# Patient Record
Sex: FEMALE | URBAN - METROPOLITAN AREA
[De-identification: names, ages, dates, MRNs, and addresses within clinical notes are randomized per-mention and may not be internally consistent; named-entity substitution may affect disease eponyms.]

---

## 2017-05-26 ENCOUNTER — TRANSFERRED RECORDS (OUTPATIENT)
Dept: HEALTH INFORMATION MANAGEMENT | Facility: CLINIC | Age: 42
End: 2017-05-26

## 2017-05-31 ENCOUNTER — MEDICAL CORRESPONDENCE (OUTPATIENT)
Dept: HEALTH INFORMATION MANAGEMENT | Facility: CLINIC | Age: 42
End: 2017-05-31

## 2017-06-01 ENCOUNTER — CARE COORDINATION (OUTPATIENT)
Dept: GASTROENTEROLOGY | Facility: CLINIC | Age: 42
End: 2017-06-01

## 2017-06-01 NOTE — PROGRESS NOTES
[facility] Intake:     Referring/Requesting provider and Health care System: [facility] in [facility] contact - Name, Phone and Fax number: [phone]  FAX:  [phone]     Requested provider (if specified): Babin    Has patient been evaluated in clinic or had a procedure [facility] provider in the last 5 years: No       Indication/Diagnosis for consultation: Chronic pancreatitis: EUS for necrosectomy for pancreatic pseudocyst    Is diagnosis on list of approved diagnosis: Yes  -    Has patient been evaluated by another Gastroenterologist? Yes     Has patient had any imaging of chest/abdominal completed? No     Are images able/being pushed to our system? No, clinic stated they do not have images and are unaware of any outside images     Is patient aware of request for clinc consultation and ok to be contacted to schedule? Yes      Referral Received: 5/31/2017    Records received:  6/1/2017: No imaging or hard copy records received.   6//16/2017: hard copy records received by RN     MD review date:      6/18/2017: routed to Dr. Kumar for review.                          Clinical History (per RN review of records provided):   * No images in PACS:     5/25/2017: EUS for pancreatic cyst on CT scan (per notes- no CT final read)   - Endosonographic imaging of the pancreas showed sonographic changes indicitive of moderate chronic pancreatitis in the entire pancreas. The parachyma and diffuse echogenicity, hyperechoic strands, hyperechoic foci, lobularity and cysts. The Pancreatic duct had hyperechoic walls. The pancreatic duct measured up to 3mm in diameter.   - An anechoic, hypoechoic, multicystic and septated lesion suggestive of a cyst was identified in the pancreatic body. It does not communicate with the pancreatic duct. The lesions measured 69mm by 33mm in maximal cross sectional diameter. There were two compartments thickly . The outer walls of the  lesion was thick. There was no associated mass. There was internal debris and necrotic tissue within the fluid filled cavity.     Recommendations/Orders:    Chart reviewed electronically (Hard copy chart in clinic.)  Per Dr. Kumar   1. Need images for review.  2. Clinic     Called and spoke to Jose, she will call and get most recent images sent to us. Address and fax number given. She will call and let us know if images can be pushed to us.   Advised once we have images and they are reviewed we will most likely get her scheduled for clinic visit. She was amenable and verbalized understanding.     7/5/2017: received in Fax CT final read dated 6/13/2017. Need images sent to us. Left message for Jose to call back as we will need the actual images sent to us on disc or pushed to us.    9/26/2017: Message received from call center - Patient called requesting an appointment for pancreatitis. There was a dot phrase started and records gathered but it looks like images were never received.   I didn't start another dot phrase as most of the information was the same. Pt did state she has additional records since June that will be faxed to us in Referrals and once received, emailed to you. I did mention to the patient that we need the images which are at [facility] and those can be pushed.   Pt said she would call the records department of [facility] and request they get pushed to PACS.     9/28/2017: called film to grab images.   10/10/2017: no images in PACS received. No hard copy records received at this time.     Sunita GRIFFITH RN Coordinator  Dr. Babin, Dr. Harris & Dr. Kumar   Pancreas~Biliary  [phone] #[phone]

## 2017-06-13 ENCOUNTER — TRANSFERRED RECORDS (OUTPATIENT)
Dept: HEALTH INFORMATION MANAGEMENT | Facility: CLINIC | Age: 42
End: 2017-06-13

## 2017-10-18 NOTE — PROGRESS NOTES
Desiree was not available but I spoke to a very helpful nurse from Dr. Justice's office who will send the images on a disc.  Location of address confirmed.  This referral will be closed on 11/18/2017 if no images are received.     SR 10/18/2017  [id number]

## 2019-08-09 ENCOUNTER — TRANSFERRED RECORDS (OUTPATIENT)
Dept: HEALTH INFORMATION MANAGEMENT | Facility: CLINIC | Age: 44
End: 2019-08-09

## 2019-08-14 ENCOUNTER — TRANSFERRED RECORDS (OUTPATIENT)
Dept: HEALTH INFORMATION MANAGEMENT | Facility: CLINIC | Age: 44
End: 2019-08-14

## 2019-08-20 ENCOUNTER — MEDICAL CORRESPONDENCE (OUTPATIENT)
Dept: HEALTH INFORMATION MANAGEMENT | Facility: CLINIC | Age: 44
End: 2019-08-20

## 2019-09-18 ENCOUNTER — TELEPHONE (OUTPATIENT)
Dept: OTOLARYNGOLOGY | Facility: CLINIC | Age: 44
End: 2019-09-18

## 2019-09-18 NOTE — TELEPHONE ENCOUNTER
FUTURE VISIT INFORMATION      FUTURE VISIT INFORMATION:    Date: 9/23/2019    Time: 9:30 AM    Location: [facility]   REFERRAL INFORMATION:    Referring provider:  Dr. Jadiel Mendez    Referring providers clinic:  [facility]     Reason for visit/diagnosis  Nasal Congestion, Post-Nasal Drip     RECORDS REQUESTED FROM:       Clinic name Comments Records Status Imaging Status   [facility] 8/20/19 Referral by Dr. Jadiel Mendez  8/14/19, 7/9/19 Office visit with Dr. Gaytan  Received- in Epic/ Care Everywhere      [facility]  CT Head: 9/15/16, 4/22/16 Care Everywhere Received  Archived to PACS                             Action 9/18/19, 12:15 pm   Action Taken Sent fax request to [facility] to push images (CT Head) into  PACS - ao   9/19/2019 -7:45 AM-Received images from [facility] Archived to PACS PB

## 2019-09-23 ENCOUNTER — OFFICE VISIT (OUTPATIENT)
Dept: OTOLARYNGOLOGY | Facility: CLINIC | Age: 44
End: 2019-09-23
Payer: COMMERCIAL

## 2019-09-23 ENCOUNTER — PRE VISIT (OUTPATIENT)
Dept: OTOLARYNGOLOGY | Facility: CLINIC | Age: 44
End: 2019-09-23

## 2019-09-23 VITALS
WEIGHT: 148 LBS | DIASTOLIC BLOOD PRESSURE: 80 MMHG | BODY MASS INDEX: 23.78 KG/M2 | RESPIRATION RATE: 18 BRPM | HEIGHT: 66 IN | SYSTOLIC BLOOD PRESSURE: 129 MMHG | HEART RATE: 75 BPM

## 2019-09-23 DIAGNOSIS — R09.A2 GLOBUS PHARYNGEUS: Primary | ICD-10-CM

## 2019-09-23 RX ORDER — GABAPENTIN 300 MG/1
CAPSULE ORAL
Refills: 11 | COMMUNITY
Start: 2019-03-10

## 2019-09-23 RX ORDER — PROMETHAZINE HYDROCHLORIDE 25 MG/1
SUPPOSITORY RECTAL
Refills: 2 | COMMUNITY
Start: 2019-02-23

## 2019-09-23 RX ORDER — UBIDECARENONE 100 MG
1 CAPSULE ORAL
COMMUNITY
Start: 2018-02-26

## 2019-09-23 RX ORDER — FLUTICASONE PROPIONATE 50 MCG
1 SPRAY, SUSPENSION (ML) NASAL
COMMUNITY

## 2019-09-23 RX ORDER — PAROXETINE 20 MG/1
60 TABLET, FILM COATED ORAL
COMMUNITY

## 2019-09-23 RX ORDER — IPRATROPIUM BROMIDE 42 UG/1
SPRAY, METERED NASAL
COMMUNITY
Start: 2019-07-16

## 2019-09-23 RX ORDER — AZELASTINE 1 MG/ML
SPRAY, METERED NASAL
Refills: 11 | COMMUNITY
Start: 2019-02-23

## 2019-09-23 RX ORDER — OLANZAPINE 5 MG/1
TABLET, ORALLY DISINTEGRATING ORAL
Refills: 0 | COMMUNITY
Start: 2019-06-06

## 2019-09-23 RX ORDER — ASPIRIN 325 MG
TABLET ORAL
Refills: 11 | COMMUNITY
Start: 2019-05-14

## 2019-09-23 RX ORDER — DULOXETIN HYDROCHLORIDE 30 MG/1
CAPSULE, DELAYED RELEASE ORAL
Refills: 11 | COMMUNITY
Start: 2019-05-10

## 2019-09-23 RX ORDER — MELATONIN/LEMON BALM LEAF EXTR 5MG-500MCG
TABLET ORAL
Refills: 0 | COMMUNITY
Start: 2018-12-11

## 2019-09-23 RX ORDER — HYDROMORPHONE HYDROCHLORIDE 2 MG/1
2 TABLET ORAL
COMMUNITY
Start: 2019-09-18

## 2019-09-23 RX ORDER — OXYBUTYNIN CHLORIDE 5 MG/1
TABLET, EXTENDED RELEASE ORAL
Refills: 1 | COMMUNITY
Start: 2019-09-12

## 2019-09-23 RX ORDER — BACLOFEN 10 MG/1
TABLET ORAL
Refills: 5 | COMMUNITY
Start: 2019-09-12

## 2019-09-23 RX ORDER — SERTRALINE HYDROCHLORIDE 25 MG/1
TABLET, FILM COATED ORAL
Refills: 11 | COMMUNITY
Start: 2019-09-12

## 2019-09-23 RX ORDER — FEXOFENADINE HCL 180 MG/1
TABLET ORAL
Refills: 5 | COMMUNITY
Start: 2019-09-12

## 2019-09-23 RX ORDER — ERGOCALCIFEROL 1.25 MG/1
CAPSULE, LIQUID FILLED ORAL
Refills: 11 | COMMUNITY
Start: 2019-09-12

## 2019-09-23 RX ORDER — THIAMINE MONONITRATE (VIT B1) 100 MG
TABLET ORAL
Refills: 2 | COMMUNITY
Start: 2019-09-12

## 2019-09-23 RX ORDER — METOCLOPRAMIDE 10 MG/1
1 TABLET ORAL
COMMUNITY
Start: 2018-02-26

## 2019-09-23 RX ORDER — MECLIZINE HCL 12.5 MG 12.5 MG/1
TABLET ORAL
Refills: 5 | COMMUNITY
Start: 2019-09-13

## 2019-09-23 RX ORDER — POLYETHYLENE GLYCOL 3350 17 G/17G
17 POWDER, FOR SOLUTION ORAL
COMMUNITY

## 2019-09-23 RX ORDER — EPINEPHRINE 0.3 MG/.3ML
INJECTION SUBCUTANEOUS
Refills: 1 | COMMUNITY
Start: 2019-08-14

## 2019-09-23 RX ORDER — PROCHLORPERAZINE MALEATE 10 MG
TABLET ORAL
COMMUNITY
Start: 2018-02-26

## 2019-09-23 RX ORDER — PROCHLORPERAZINE 25 MG
SUPPOSITORY, RECTAL RECTAL
COMMUNITY
Start: 2018-02-23

## 2019-09-23 RX ORDER — HYDROXYZINE HYDROCHLORIDE 25 MG/1
TABLET, FILM COATED ORAL
Refills: 10 | COMMUNITY
Start: 2019-06-09

## 2019-09-23 RX ORDER — LORAZEPAM 1 MG/1
TABLET ORAL
Refills: 0 | COMMUNITY
Start: 2019-08-14

## 2019-09-23 RX ORDER — ONDANSETRON 4 MG/1
TABLET, ORALLY DISINTEGRATING ORAL
Refills: 4 | COMMUNITY
Start: 2019-09-12

## 2019-09-23 RX ORDER — PANCRELIPASE 60000; 12000; 38000 [USP'U]/1; [USP'U]/1; [USP'U]/1
CAPSULE, DELAYED RELEASE PELLETS ORAL
Refills: 5 | COMMUNITY
Start: 2019-05-01

## 2019-09-23 RX ORDER — CETIRIZINE HYDROCHLORIDE 10 MG/1
TABLET ORAL
Refills: 8 | COMMUNITY
Start: 2019-09-12

## 2019-09-23 RX ORDER — ONDANSETRON 4 MG/1
TABLET, ORALLY DISINTEGRATING ORAL
COMMUNITY
Start: 2018-11-08

## 2019-09-23 ASSESSMENT — PATIENT HEALTH QUESTIONNAIRE - PHQ9
10. IF YOU CHECKED OFF ANY PROBLEMS, HOW DIFFICULT HAVE THESE PROBLEMS MADE IT FOR YOU TO DO YOUR WORK, TAKE CARE OF THINGS AT HOME, OR GET ALONG WITH OTHER PEOPLE: SOMEWHAT DIFFICULT
SUM OF ALL RESPONSES TO PHQ QUESTIONS 1-9: 12
SUM OF ALL RESPONSES TO PHQ QUESTIONS 1-9: 12

## 2019-09-23 ASSESSMENT — PAIN SCALES - GENERAL: PAINLEVEL: MILD PAIN (3)

## 2019-09-23 ASSESSMENT — MIFFLIN-ST. JEOR: SCORE: 1344.07

## 2019-09-23 NOTE — LETTER
9/23/2019       RE: Jose Mayorga  [address] [facility] [address]     Dear Colleague,    Thank you for referring your patient, Jose Mayorga, to the [facility] EAR NOSE AND THROAT at [facility]. Please see a copy of my visit note below.    HISTORY OF PRESENT ILLNESS:  Jose is here to see me today at the request of her primary and a question that reportedly was brought up by Dr. Duque at [facility].  The patient is here for evaluation of primarily postnasal drainage.  She has had this issue for a long time.  She states that it has been very bothersome for her.  Recently, because of it causing nausea and vomiting and it is worse primarily in the morning.  She has been seen at [facility] where septorhinoplasty has been recommended by report.  They had asked for her to be seen here for consideration of what sounds to be the ClariFix procedure to address her postnasal drainage.  She states that she has tried numerous medications for this drainage without improvement and all different types of nasal sprays.      PAST MEDICAL HISTORY:     1. Consistent with chronic pancreatitis, for which she has a central line for hydration.     2. She has undergone allergy evaluation which was reportedly negative.   3. She is followed by gastroenterologist at [facility].   4. She does have reflux which is suboptimally treated.      REVIEW OF SYSTEMS:  Pertinent positives and negatives as above.      FAMILY HISTORY:  Noted and reviewed in the chart.      SOCIAL HISTORY:  Noted and reviewed in the chart.      PHYSICAL EXAMINATION:  On examination, this is a 43-year-old woman in no acute distress.  Normal mood, normal affect, normal ability to communicate.  Alert and appropriate.  Breathing without difficulty or stridor.  Eyes are anicteric.  Skin of the head and neck appears normal.  Anterior nasal exam does show a right-sided septal deviation.  Oral cavity shows normal tongue, buccal  mucosa and oropharynx.  Examination of the ears shows normal external auditory canals and tympanic membranes.  Palpation of neck shows no masses, tenderness or lymphadenopathy.      PROCEDURE:  At that point, verbal consent was obtained.  Nose is sprayed with lidocaine and Afrin. Nasal endoscopy is performed.  Endoscope was passed through the left side of the nose into the nasopharynx which is normal.  The oropharynx is normal.  Hypopharynx is normal.  Larynx shows no lesions or ulcerations.  Normal vocal fold movement.  Nowhere on the exam is there evidence of abnormal mucus.  Examination of the right side of the nose shows no abnormal mucus, although it is limited due to septal deviation.      ASSESSMENT:  A 43-year-old woman with complaints of postnasal drainage causing vomiting that has been unrelenting and is not improved with numerous medications aimed at decreasing her changing her mucus.  I expressed to her that I am uncertain that this is symptoms that are being caused by postnasal drainage.        PLAN:  I recommended evaluation of possible persistent reflux despite treatment.  She expressed interest in the ClariFix procedure.  I have never done this before, nor has anyone in my clinic currently.  We can work to get approval for that, although I told her that I am not convinced that this would help with symptoms.  I would like her to get a pH probe study.  We will search for approval for the procedure and she will come back once we are able to provide that for her if she would like.         Again, thank you for allowing me to participate in the care of your patient.      Sincerely,    Arya Martinez MD

## 2019-09-23 NOTE — PATIENT INSTRUCTIONS
You were seen in the ENT clinic today with Dr. Martinez    Recommendations for you:    -pH probe study-please contact your Gastroenterologist to schedule.       We would like you to follow up in 6 weeks for ClariFix procedure. This is something not commonly done in our clinics. We will contact you if we are unable to perform this here.       Please call our clinic for any questions, concerns, and/or worsening symptoms.      Clinic #[phone]       Option 1 for scheduling.    Thank you for allowing us to be apart of your care!    Natalie QUINONES, SHAQCC    If you need to reach me my direct line is: [phone]

## 2019-09-23 NOTE — LETTER
Date:October 1, 2019      Patient was self referred, no letter generated. Do not send.        [facility] Health Information
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
## 2019-09-23 NOTE — PROGRESS NOTES
HISTORY OF PRESENT ILLNESS:  Jose is here to see me today at the request of her primary and a question that reportedly was brought up by Dr. Duque at [facility].  The patient is here for evaluation of primarily postnasal drainage.  She has had this issue for a long time.  She states that it has been very bothersome for her.  Recently, because of it causing nausea and vomiting and it is worse primarily in the morning.  She has been seen at [facility] where septorhinoplasty has been recommended by report.  They had asked for her to be seen here for consideration of what sounds to be the ClariFix procedure to address her postnasal drainage.  She states that she has tried numerous medications for this drainage without improvement and all different types of nasal sprays.      PAST MEDICAL HISTORY:     1. Consistent with chronic pancreatitis, for which she has a central line for hydration.     2. She has undergone allergy evaluation which was reportedly negative.   3. She is followed by gastroenterologist at [facility].   4. She does have reflux which is suboptimally treated.      REVIEW OF SYSTEMS:  Pertinent positives and negatives as above.      FAMILY HISTORY:  Noted and reviewed in the chart.      SOCIAL HISTORY:  Noted and reviewed in the chart.      PHYSICAL EXAMINATION:  On examination, this is a 43-year-old woman in no acute distress.  Normal mood, normal affect, normal ability to communicate.  Alert and appropriate.  Breathing without difficulty or stridor.  Eyes are anicteric.  Skin of the head and neck appears normal.  Anterior nasal exam does show a right-sided septal deviation.  Oral cavity shows normal tongue, buccal mucosa and oropharynx.  Examination of the ears shows normal external auditory canals and tympanic membranes.  Palpation of neck shows no masses, tenderness or lymphadenopathy.      PROCEDURE:  At that point, verbal consent was obtained.  Nose is sprayed with lidocaine and Afrin. Nasal  endoscopy is performed.  Endoscope was passed through the left side of the nose into the nasopharynx which is normal.  The oropharynx is normal.  Hypopharynx is normal.  Larynx shows no lesions or ulcerations.  Normal vocal fold movement.  Nowhere on the exam is there evidence of abnormal mucus.  Examination of the right side of the nose shows no abnormal mucus, although it is limited due to septal deviation.      ASSESSMENT:  A 43-year-old woman with complaints of postnasal drainage causing vomiting that has been unrelenting and is not improved with numerous medications aimed at decreasing her changing her mucus.  I expressed to her that I am uncertain that this is symptoms that are being caused by postnasal drainage.        PLAN:  I recommended evaluation of possible persistent reflux despite treatment.  She expressed interest in the ClariFix procedure.  I have never done this before, nor has anyone in my clinic currently.  We can work to get approval for that, although I told her that I am not convinced that this would help with symptoms.  I would like her to get a pH probe study.  We will search for approval for the procedure and she will come back once we are able to provide that for her if she would like.

## 2019-09-24 ASSESSMENT — PATIENT HEALTH QUESTIONNAIRE - PHQ9: SUM OF ALL RESPONSES TO PHQ QUESTIONS 1-9: 12

## 2019-11-04 ENCOUNTER — OFFICE VISIT (OUTPATIENT)
Dept: OTOLARYNGOLOGY | Facility: CLINIC | Age: 44
End: 2019-11-04
Payer: COMMERCIAL

## 2019-11-04 VITALS — HEIGHT: 66 IN | BODY MASS INDEX: 23.95 KG/M2 | WEIGHT: 149 LBS

## 2019-11-04 DIAGNOSIS — R09.A2 GLOBUS PHARYNGEUS: Primary | ICD-10-CM

## 2019-11-04 ASSESSMENT — PAIN SCALES - GENERAL: PAINLEVEL: MILD PAIN (3)

## 2019-11-04 ASSESSMENT — MIFFLIN-ST. JEOR: SCORE: 1352.61

## 2019-11-04 NOTE — NURSING NOTE
"Chief Complaint   Patient presents with     RECHECK     6 week follow-up     Height 1.676 m (5' 6\"), weight 67.6 kg (149 lb).    Jennifer Mendez, EMT    "

## 2019-11-04 NOTE — LETTER
Date:November 19, 2019      Patient was self referred, no letter generated. Do not send.        [facility] Health Information

## 2019-11-04 NOTE — PROGRESS NOTES
Jose is here again for follow up and her sense of postnasal drainage and globus.  She has remained on a proton pump inhibitor but still has symptoms of reflux.  She is scheduled for a pH probe on Wednesday.  She continues to experience this as a postnasal drainage type symptom and has been on a number of interventions without improvement.  At this time, we will await the pH probe study, review the results, and give her a call regarding any recommendations we may have for her next step.      A total of 10 minutes was spent with the patient, all on coordination and counseling regarding her globus sensation.

## 2019-11-04 NOTE — LETTER
11/4/2019       RE: Jose Mayorga  [address]  [place] [address]     Dear Colleague,    Thank you for referring your patient, Jose Mayorga, to the [facility] EAR NOSE AND THROAT at [facility]. Please see a copy of my visit note below.    Jose is here again for follow up and her sense of postnasal drainage and globus.  She has remained on a proton pump inhibitor but still has symptoms of reflux.  She is scheduled for a pH probe on Wednesday.  She continues to experience this as a postnasal drainage type symptom and has been on a number of interventions without improvement.  At this time, we will await the pH probe study, review the results, and give her a call regarding any recommendations we may have for her next step.      A total of 10 minutes was spent with the patient, all on coordination and counseling regarding her globus sensation.           Again, thank you for allowing me to participate in the care of your patient.      Sincerely,    Arya Martinez MD

## 2019-11-06 ENCOUNTER — TRANSFERRED RECORDS (OUTPATIENT)
Dept: HEALTH INFORMATION MANAGEMENT | Facility: CLINIC | Age: 44
End: 2019-11-06

## 2019-11-11 ENCOUNTER — TRANSFERRED RECORDS (OUTPATIENT)
Dept: HEALTH INFORMATION MANAGEMENT | Facility: CLINIC | Age: 44
End: 2019-11-11

## 2019-11-12 ENCOUNTER — TRANSFERRED RECORDS (OUTPATIENT)
Dept: HEALTH INFORMATION MANAGEMENT | Facility: CLINIC | Age: 44
End: 2019-11-12

## 2019-11-26 ENCOUNTER — TELEPHONE (OUTPATIENT)
Dept: OTOLARYNGOLOGY | Facility: CLINIC | Age: 44
End: 2019-11-26

## 2019-11-26 NOTE — TELEPHONE ENCOUNTER
Per Natalie Sierra, SHAQ informaed patient that Dr. Martinez reviewed the results of her testing at [facility] and would like her to follow-up with her PCP for management of her reflux. Dr. Martinez would not make any recommendations for sinus surgery until her reflux is under better control. Patient expressed understanding, and plans to follow-up with her PCP.    Jennifer Mendez, EMT

## 2021-05-29 ENCOUNTER — RECORDS - HEALTHEAST (OUTPATIENT)
Dept: ADMINISTRATIVE | Facility: CLINIC | Age: 46
End: 2021-05-29

## 2021-05-30 ENCOUNTER — RECORDS - HEALTHEAST (OUTPATIENT)
Dept: ADMINISTRATIVE | Facility: CLINIC | Age: 46
End: 2021-05-30

## 2021-05-31 ENCOUNTER — RECORDS - HEALTHEAST (OUTPATIENT)
Dept: ADMINISTRATIVE | Facility: CLINIC | Age: 46
End: 2021-05-31